# Patient Record
Sex: MALE | Race: WHITE | NOT HISPANIC OR LATINO | Employment: PART TIME | URBAN - METROPOLITAN AREA
[De-identification: names, ages, dates, MRNs, and addresses within clinical notes are randomized per-mention and may not be internally consistent; named-entity substitution may affect disease eponyms.]

---

## 2024-11-03 ENCOUNTER — APPOINTMENT (OUTPATIENT)
Dept: GENERAL RADIOLOGY | Facility: HOSPITAL | Age: 54
End: 2024-11-03
Payer: COMMERCIAL

## 2024-11-03 ENCOUNTER — HOSPITAL ENCOUNTER (EMERGENCY)
Facility: HOSPITAL | Age: 54
Discharge: HOME | End: 2024-11-03
Attending: EMERGENCY MEDICINE | Admitting: EMERGENCY MEDICINE
Payer: COMMERCIAL

## 2024-11-03 VITALS
HEART RATE: 81 BPM | BODY MASS INDEX: 31.5 KG/M2 | RESPIRATION RATE: 16 BRPM | HEIGHT: 70 IN | TEMPERATURE: 98.2 F | DIASTOLIC BLOOD PRESSURE: 75 MMHG | WEIGHT: 220 LBS | SYSTOLIC BLOOD PRESSURE: 147 MMHG | OXYGEN SATURATION: 96 %

## 2024-11-03 DIAGNOSIS — I10 HYPERTENSION, UNSPECIFIED TYPE: ICD-10-CM

## 2024-11-03 DIAGNOSIS — F41.0 PANIC ATTACK: Primary | ICD-10-CM

## 2024-11-03 LAB
ALBUMIN SERPL-MCNC: 4.3 G/DL (ref 3.5–5.2)
ALBUMIN/GLOB SERPL: 1.7 G/DL
ALP SERPL-CCNC: 50 U/L (ref 39–117)
ALT SERPL W P-5'-P-CCNC: 49 U/L (ref 1–41)
ANION GAP SERPL CALCULATED.3IONS-SCNC: 13 MMOL/L (ref 5–15)
AST SERPL-CCNC: 26 U/L (ref 1–40)
BASOPHILS # BLD AUTO: 0.05 10*3/MM3 (ref 0–0.2)
BASOPHILS NFR BLD AUTO: 0.4 % (ref 0–1.5)
BILIRUB SERPL-MCNC: 0.3 MG/DL (ref 0–1.2)
BUN SERPL-MCNC: 22 MG/DL (ref 6–20)
BUN/CREAT SERPL: 14.8 (ref 7–25)
CALCIUM SPEC-SCNC: 9.1 MG/DL (ref 8.6–10.5)
CHLORIDE SERPL-SCNC: 105 MMOL/L (ref 98–107)
CO2 SERPL-SCNC: 21 MMOL/L (ref 22–29)
CREAT SERPL-MCNC: 1.49 MG/DL (ref 0.76–1.27)
DEPRECATED RDW RBC AUTO: 44.3 FL (ref 37–54)
EGFRCR SERPLBLD CKD-EPI 2021: 55.4 ML/MIN/1.73
EOSINOPHIL # BLD AUTO: 0.17 10*3/MM3 (ref 0–0.4)
EOSINOPHIL NFR BLD AUTO: 1.5 % (ref 0.3–6.2)
ERYTHROCYTE [DISTWIDTH] IN BLOOD BY AUTOMATED COUNT: 13.6 % (ref 12.3–15.4)
GLOBULIN UR ELPH-MCNC: 2.6 GM/DL
GLUCOSE SERPL-MCNC: 157 MG/DL (ref 65–99)
HCT VFR BLD AUTO: 47.2 % (ref 37.5–51)
HGB BLD-MCNC: 15.2 G/DL (ref 13–17.7)
IMM GRANULOCYTES # BLD AUTO: 0.05 10*3/MM3 (ref 0–0.05)
IMM GRANULOCYTES NFR BLD AUTO: 0.4 % (ref 0–0.5)
LYMPHOCYTES # BLD AUTO: 1.01 10*3/MM3 (ref 0.7–3.1)
LYMPHOCYTES NFR BLD AUTO: 8.7 % (ref 19.6–45.3)
MCH RBC QN AUTO: 28.5 PG (ref 26.6–33)
MCHC RBC AUTO-ENTMCNC: 32.2 G/DL (ref 31.5–35.7)
MCV RBC AUTO: 88.6 FL (ref 79–97)
MONOCYTES # BLD AUTO: 0.76 10*3/MM3 (ref 0.1–0.9)
MONOCYTES NFR BLD AUTO: 6.6 % (ref 5–12)
NEUTROPHILS NFR BLD AUTO: 82.4 % (ref 42.7–76)
NEUTROPHILS NFR BLD AUTO: 9.55 10*3/MM3 (ref 1.7–7)
NRBC BLD AUTO-RTO: 0 /100 WBC (ref 0–0.2)
PLATELET # BLD AUTO: 258 10*3/MM3 (ref 140–450)
PMV BLD AUTO: 9.9 FL (ref 6–12)
POTASSIUM SERPL-SCNC: 4.2 MMOL/L (ref 3.5–5.2)
PROT SERPL-MCNC: 6.9 G/DL (ref 6–8.5)
RBC # BLD AUTO: 5.33 10*6/MM3 (ref 4.14–5.8)
SODIUM SERPL-SCNC: 139 MMOL/L (ref 136–145)
TSH SERPL DL<=0.05 MIU/L-ACNC: 0.58 UIU/ML (ref 0.27–4.2)
WBC NRBC COR # BLD AUTO: 11.59 10*3/MM3 (ref 3.4–10.8)

## 2024-11-03 PROCEDURE — 80050 GENERAL HEALTH PANEL: CPT | Performed by: EMERGENCY MEDICINE

## 2024-11-03 PROCEDURE — 99284 EMERGENCY DEPT VISIT MOD MDM: CPT

## 2024-11-03 PROCEDURE — 93005 ELECTROCARDIOGRAM TRACING: CPT | Performed by: EMERGENCY MEDICINE

## 2024-11-03 PROCEDURE — 71045 X-RAY EXAM CHEST 1 VIEW: CPT

## 2024-11-03 NOTE — ED PROVIDER NOTES
EMERGENCY DEPARTMENT ENCOUNTER    Room Number:  32/32  PCP: Keshav Santos MD  Historian: Patient      HPI:  Chief Complaint: Panic attack  A complete HPI/ROS/PMH/PSH/SH/FH are unobtainable due to: None  Context: Kenn Norton is a 54 y.o. male who presents to the ED c/o panic attack.  Patient states he has had problems with panic attack in the remote past.  States over the last 2 weeks has had increasing problems with panic attack.  Patient has history of newly diagnosed hypertension has been concerned about this.  States today started having severe anxiety with heart racing.  Patient states he had some panic.  Had no chest pain pressure tightness.  Was seen at urgent care and sent here for evaluation.            PAST MEDICAL HISTORY  Active Ambulatory Problems     Diagnosis Date Noted    No Active Ambulatory Problems     Resolved Ambulatory Problems     Diagnosis Date Noted    No Resolved Ambulatory Problems     Past Medical History:   Diagnosis Date    Anxiety     Celiac disease          PAST SURGICAL HISTORY  History reviewed. No pertinent surgical history.      FAMILY HISTORY  History reviewed. No pertinent family history.      SOCIAL HISTORY  Social History     Socioeconomic History    Marital status:    Tobacco Use    Smoking status: Never    Smokeless tobacco: Never   Vaping Use    Vaping status: Never Used   Substance and Sexual Activity    Alcohol use: Not Currently         ALLERGIES  Patient has no known allergies.        REVIEW OF SYSTEMS  Review of Systems   Anxiety      PHYSICAL EXAM  ED Triage Vitals   Temp Heart Rate Resp BP SpO2   11/03/24 1757 11/03/24 1757 11/03/24 1757 11/03/24 1800 11/03/24 1757   98.2 °F (36.8 °C) 107 16 (!) 182/85 98 %      Temp src Heart Rate Source Patient Position BP Location FiO2 (%)   -- -- -- -- --              Physical Exam      GENERAL: no acute distress  HENT: nares patent  EYES: no scleral icterus  CV: regular rhythm, normal rate  RESPIRATORY: normal  effort  ABDOMEN: soft  MUSCULOSKELETAL: no deformity  NEURO: alert, moves all extremities, follows commands  PSYCH:  calm, cooperative  SKIN: warm, dry    Vital signs and nursing notes reviewed.          LAB RESULTS  Recent Results (from the past 24 hours)   Comprehensive Metabolic Panel    Collection Time: 11/03/24  6:36 PM    Specimen: Blood   Result Value Ref Range    Glucose 157 (H) 65 - 99 mg/dL    BUN 22 (H) 6 - 20 mg/dL    Creatinine 1.49 (H) 0.76 - 1.27 mg/dL    Sodium 139 136 - 145 mmol/L    Potassium 4.2 3.5 - 5.2 mmol/L    Chloride 105 98 - 107 mmol/L    CO2 21.0 (L) 22.0 - 29.0 mmol/L    Calcium 9.1 8.6 - 10.5 mg/dL    Total Protein 6.9 6.0 - 8.5 g/dL    Albumin 4.3 3.5 - 5.2 g/dL    ALT (SGPT) 49 (H) 1 - 41 U/L    AST (SGOT) 26 1 - 40 U/L    Alkaline Phosphatase 50 39 - 117 U/L    Total Bilirubin 0.3 0.0 - 1.2 mg/dL    Globulin 2.6 gm/dL    A/G Ratio 1.7 g/dL    BUN/Creatinine Ratio 14.8 7.0 - 25.0    Anion Gap 13.0 5.0 - 15.0 mmol/L    eGFR 55.4 (L) >60.0 mL/min/1.73   TSH    Collection Time: 11/03/24  6:36 PM    Specimen: Blood   Result Value Ref Range    TSH 0.585 0.270 - 4.200 uIU/mL   CBC Auto Differential    Collection Time: 11/03/24  6:36 PM    Specimen: Blood   Result Value Ref Range    WBC 11.59 (H) 3.40 - 10.80 10*3/mm3    RBC 5.33 4.14 - 5.80 10*6/mm3    Hemoglobin 15.2 13.0 - 17.7 g/dL    Hematocrit 47.2 37.5 - 51.0 %    MCV 88.6 79.0 - 97.0 fL    MCH 28.5 26.6 - 33.0 pg    MCHC 32.2 31.5 - 35.7 g/dL    RDW 13.6 12.3 - 15.4 %    RDW-SD 44.3 37.0 - 54.0 fl    MPV 9.9 6.0 - 12.0 fL    Platelets 258 140 - 450 10*3/mm3    Neutrophil % 82.4 (H) 42.7 - 76.0 %    Lymphocyte % 8.7 (L) 19.6 - 45.3 %    Monocyte % 6.6 5.0 - 12.0 %    Eosinophil % 1.5 0.3 - 6.2 %    Basophil % 0.4 0.0 - 1.5 %    Immature Grans % 0.4 0.0 - 0.5 %    Neutrophils, Absolute 9.55 (H) 1.70 - 7.00 10*3/mm3    Lymphocytes, Absolute 1.01 0.70 - 3.10 10*3/mm3    Monocytes, Absolute 0.76 0.10 - 0.90 10*3/mm3    Eosinophils,  Absolute 0.17 0.00 - 0.40 10*3/mm3    Basophils, Absolute 0.05 0.00 - 0.20 10*3/mm3    Immature Grans, Absolute 0.05 0.00 - 0.05 10*3/mm3    nRBC 0.0 0.0 - 0.2 /100 WBC   ECG 12 Lead Tachycardia    Collection Time: 11/03/24  7:09 PM   Result Value Ref Range    QT Interval 355 ms    QTC Interval 435 ms       Ordered the above labs and reviewed the results.        RADIOLOGY  XR Chest 1 View    Result Date: 11/3/2024  XR CHEST 1 VW-  HISTORY: 54-year-old male with shortness of air.  FINDINGS: Limited apical lordotic projection. There is no evidence for pneumonia, effusions, or CHF. Follow-up with 2 views of the chest is recommended.  This report was finalized on 11/3/2024 6:52 PM by Dr. Mali Foley M.D on Workstation: [id number]       Ordered the above noted radiological studies.  Chest x-ray independently interpreted by me and shows no evidence of pneumonia          PROCEDURES  Procedures    EKG          EKG time: 1909  Rhythm/Rate: Normal sinus rhythm 90  P waves and W[place]: Normal P waves  QRS, axis: Normal QRS  ST and T waves: Normal ST-T waves    Interpreted Contemporaneously by me, independently viewed  No prior      MEDICATIONS GIVEN IN ER  Medications - No data to display                MEDICAL DECISION MAKING, PROGRESS, and CONSULTS    All labs have been independently reviewed by me.  All radiology studies have been reviewed by me and I have also reviewed the radiology report.   EKG's independently viewed and interpreted by me.  Discussion below represents my analysis of pertinent findings related to patient's condition, differential diagnosis, treatment plan and final disposition.      Additional sources:  - Discussed/ obtained information from independent historians: None    - External (non-ED) record review: Epic reviewed patient seen today at urgent care for anxiety and palpitations    - Chronic or social conditions impacting care: None    - Shared decision making: None      Orders placed during this  visit:  Orders Placed This Encounter   Procedures    XR Chest 1 View    Comprehensive Metabolic Panel    TSH    CBC Auto Differential    ECG 12 Lead Tachycardia    CBC & Differential         Additional orders considered but not ordered:  None        Differential diagnosis includes but is not limited to:    Panic attack versus generalized anxiety versus dysrhythmia      Independent interpretation of labs, radiology studies, and discussions with consultants:  ED Course as of 11/03/24 2220   Sun Nov 03, 2024 2002 20:02 EST  Patient here for anxiety and panic attacks.  Patient states he has been significantly more anxious after hearing he has high blood pressure.  Patient states he feels significantly better now.  Patient has appointment with primary provider tomorrow.  Will hold off on starting medications for now.  Patient's creatinine is mildly elevated.  Last creatinine to compare to was 9 years ago.  Patient will make sure he lets his primary doctor see this tomorrow. [SL]      ED Course User Index  [SL] Elia Salmon MD                 DIAGNOSIS  Final diagnoses:   Panic attack   Hypertension, unspecified type         DISPOSITION  DISCHARGE    Patient discharged in stable condition.    Reviewed implications of results, diagnosis, meds, responsibility to follow up, warning signs and symptoms of possible worsening, potential complications and reasons to return to ER, including worsening symptoms    Patient/Family voiced understanding of above instructions.    Discussed plan for discharge, as there is no emergent indication for admission. Patient referred to primary care provider for BP management due to today's BP. Pt/family is agreeable and understands need for follow up and repeat testing.  Pt is aware that discharge does not mean that nothing is wrong but it indicates no emergency is present that requires admission and they must continue care with follow-up as given below or physician of their choice.      FOLLOW-UP  Keshav Santos MD  [address]  [address]  [facility] [address]  [phone]    Schedule an appointment as soon as possible for a visit            Medication List      No changes were made to your prescriptions during this visit.                Latest Documented Vital Signs:  As of 22:20 EST  BP- 147/75 HR- 81 Temp- 98.2 °F (36.8 °C) O2 sat- 96%              --    Please note that portions of this were completed with a voice recognition program.       Note Disclaimer: At [facility], we believe that sharing information builds trust and better relationships. You are receiving this note because you are receiving care at [facility] or recently visited. It is possible you will see health information before a provider has talked with you about it. This kind of information can be easy to misunderstand. To help you fully understand what it means for your health, we urge you to discuss this note with your provider.            Elia Salmon MD  11/03/24 2584

## 2024-11-04 ENCOUNTER — OFFICE VISIT (OUTPATIENT)
Dept: INTERNAL MEDICINE | Facility: CLINIC | Age: 54
End: 2024-11-04
Payer: COMMERCIAL

## 2024-11-04 VITALS
SYSTOLIC BLOOD PRESSURE: 158 MMHG | TEMPERATURE: 98.2 F | WEIGHT: 223 LBS | HEIGHT: 70 IN | DIASTOLIC BLOOD PRESSURE: 86 MMHG | BODY MASS INDEX: 31.92 KG/M2

## 2024-11-04 DIAGNOSIS — Z11.59 ENCOUNTER FOR HEPATITIS C SCREENING TEST FOR LOW RISK PATIENT: ICD-10-CM

## 2024-11-04 DIAGNOSIS — F41.0 PANIC ATTACKS: ICD-10-CM

## 2024-11-04 DIAGNOSIS — Z00.00 WELLNESS EXAMINATION: ICD-10-CM

## 2024-11-04 DIAGNOSIS — Z12.5 SCREENING FOR PROSTATE CANCER: ICD-10-CM

## 2024-11-04 DIAGNOSIS — I10 HYPERTENSION, UNSPECIFIED TYPE: Primary | ICD-10-CM

## 2024-11-04 PROBLEM — K90.0 CD (CELIAC DISEASE): Status: ACTIVE | Noted: 2024-11-04

## 2024-11-04 LAB
QT INTERVAL: 355 MS
QTC INTERVAL: 435 MS

## 2024-11-04 PROCEDURE — 99204 OFFICE O/P NEW MOD 45 MIN: CPT | Performed by: STUDENT IN AN ORGANIZED HEALTH CARE EDUCATION/TRAINING PROGRAM

## 2024-11-04 RX ORDER — CITALOPRAM HYDROBROMIDE 10 MG/1
TABLET ORAL
Qty: 67 TABLET | Refills: 0 | Status: SHIPPED | OUTPATIENT
Start: 2024-11-04 | End: 2024-12-11

## 2024-11-04 RX ORDER — PROPRANOLOL HYDROCHLORIDE 10 MG/1
10 TABLET ORAL 2 TIMES DAILY PRN
Qty: 60 TABLET | Refills: 2 | Status: SHIPPED | OUTPATIENT
Start: 2024-11-04

## 2024-11-04 RX ORDER — AMLODIPINE BESYLATE 5 MG/1
5 TABLET ORAL DAILY
Qty: 90 TABLET | Refills: 1 | Status: SHIPPED | OUTPATIENT
Start: 2024-11-04

## 2024-11-04 NOTE — PROGRESS NOTES
New Patient Office Visit      Patient Name: Kenn Norton  : 1970   MRN: [id number]   Care Team: Patient Care Team:  Keshav Santos MD as PCP - General (Internal Medicine)    Chief Complaint:    Chief Complaint   Patient presents with    Elevated Blood Pressure    Establish Care       History of Present Illness: Kenn Norton is a 54 y.o. male who is here today to establish care.      He has not been seen by primary care in some time however wanted to make this appointment to check blood pressure    His primary concern today is previous elevated blood pressure readings as well as panic attacks that have been going on for the past 6 weeks.  He states he has had his blood pressure checked more than 2 times over the past 6 months he was told his is elevated.  Additionally he has been having ongoing panic attacks and anxiety due to recent stressors in his life, he went to the emergency room yesterday with workup being unremarkable and reassuring.  He was previously on Celexa however symptoms were stable so previous psychiatrist tapered and discontinued this.    Other medical history is notable for celiac disease, he does have a family history of bladder and prostate cancer in his father.  Does have coronary artery disease in his father and brother, brother had myocardial infarction at age 55.        Subjective      Review of Systems:   Review of Systems - See HPI    Past Medical History:   Past Medical History:   Diagnosis Date    Anxiety     Celiac disease     History of medical problems 2010    Celiac Disease       Past Surgical History: History reviewed. No pertinent surgical history.    Family History:   Family History   Problem Relation Age of Onset    Diabetes Mother     Hyperlipidemia Mother     Cancer Father     Diabetes Father     Hearing loss Father     Heart disease Father     Hyperlipidemia Father     Heart disease Brother     Hyperlipidemia Brother        Social History:   Social  "History     Socioeconomic History    Marital status:    Tobacco Use    Smoking status: Never    Smokeless tobacco: Never   Vaping Use    Vaping status: Never Used   Substance and Sexual Activity    Alcohol use: Never    Drug use: Never    Sexual activity: Yes     Partners: Female     Birth control/protection: Vasectomy       Tobacco History:   Social History     Tobacco Use   Smoking Status Never   Smokeless Tobacco Never       Medications:     Current Outpatient Medications:     amLODIPine (NORVASC) 5 MG tablet, Take 1 tablet by mouth Daily., Disp: 90 tablet, Rfl: 1    citalopram (CeleXA) 10 MG tablet, Take 1 tablet by mouth Daily for 7 days, THEN 2 tablets Daily for 30 days., Disp: 67 tablet, Rfl: 0    propranolol (INDERAL) 10 MG tablet, Take 1 tablet by mouth 2 (Two) Times a Day As Needed (anxiety)., Disp: 60 tablet, Rfl: 2    Allergies:   No Known Allergies    Objective     Physical Exam:  Vital Signs:   Vitals:    11/04/24 1508   BP: 158/86   BP Location: Left arm   Patient Position: Sitting   Temp: 98.2 °F (36.8 °C)   Weight: 101 kg (223 lb)   Height: 177.8 cm (70\")     Body mass index is 32 kg/m².     BMI is >= 30 and <35. (Class 1 Obesity). The following options were offered after discussion;: exercise counseling/recommendations      Physical Exam  Vitals reviewed.   Constitutional:       General: He is not in acute distress.     Appearance: Normal appearance. He is obese. He is not toxic-appearing.   HENT:      Head: Normocephalic and atraumatic.   Eyes:      General: No scleral icterus.     Conjunctiva/sclera: Conjunctivae normal.   Cardiovascular:      Rate and Rhythm: Normal rate and regular rhythm.      Heart sounds: Normal heart sounds.   Pulmonary:      Effort: Pulmonary effort is normal. No respiratory distress.      Breath sounds: Normal breath sounds. No wheezing.   Skin:     General: Skin is warm and dry.   Neurological:      Mental Status: He is alert and oriented to person, place, and " time.   Psychiatric:         Mood and Affect: Mood normal.         Behavior: Behavior normal.         Assessment / Plan      Assessment/Plan:   Problems Addressed This Visit  Diagnoses and all orders for this visit:    1. Hypertension, unspecified type (Primary)  -     amLODIPine (NORVASC) 5 MG tablet; Take 1 tablet by mouth Daily.  Dispense: 90 tablet; Refill: 1    2. Panic attacks  -     citalopram (CeleXA) 10 MG tablet; Take 1 tablet by mouth Daily for 7 days, THEN 2 tablets Daily for 30 days.  Dispense: 67 tablet; Refill: 0  -     propranolol (INDERAL) 10 MG tablet; Take 1 tablet by mouth 2 (Two) Times a Day As Needed (anxiety).  Dispense: 60 tablet; Refill: 2    3. Wellness examination  -     Lipid Panel With / Chol / HDL Ratio; Future  -     Comprehensive Metabolic Panel; Future  -     CBC & Differential; Future    4. Screening for prostate cancer  -     PSA Screen; Future    5. Encounter for hepatitis C screening test for low risk patient  -     Hepatitis C Antibody; Future      Reviewed most recent ED visit from yesterday, labs from yesterday    Hypertension is likely chronic and untreated, given multiple readings at home and at other office visits we will plan to initiate treatment with amlodipine 5 mg to start and will follow-up in about 1 month or sooner.    Anxiety and associated panic attacks are chronic and worsening, will plan to start Celexa and taper up from 10 mg to 20 mg as well as start propranolol as needed.  Reviewed EKG obtained during ED visit and reassuring,    Will plan to have him follow-up in about 1 month for annual physical or sooner as needed, labs prior      Plan of care reviewed with patient at the conclusion of today's visit. Education was provided regarding diagnosis and management.  Patient verbalizes understanding of and agreement with management plan.      Follow Up:   Return in about 1 month (around 12/4/2024) for Annual physical.          MD SAUL [facility]choaK [facility]  [facility]  [address] [place] [phone]  Fax [phone]

## 2024-11-26 ENCOUNTER — LAB (OUTPATIENT)
Facility: HOSPITAL | Age: 54
End: 2024-11-26
Payer: COMMERCIAL

## 2024-11-26 PROCEDURE — 86803 HEPATITIS C AB TEST: CPT | Performed by: STUDENT IN AN ORGANIZED HEALTH CARE EDUCATION/TRAINING PROGRAM

## 2024-11-26 PROCEDURE — 85025 COMPLETE CBC W/AUTO DIFF WBC: CPT | Performed by: STUDENT IN AN ORGANIZED HEALTH CARE EDUCATION/TRAINING PROGRAM

## 2024-11-26 PROCEDURE — 80053 COMPREHEN METABOLIC PANEL: CPT | Performed by: STUDENT IN AN ORGANIZED HEALTH CARE EDUCATION/TRAINING PROGRAM

## 2024-11-26 PROCEDURE — G0103 PSA SCREENING: HCPCS | Performed by: STUDENT IN AN ORGANIZED HEALTH CARE EDUCATION/TRAINING PROGRAM

## 2024-11-26 PROCEDURE — 80061 LIPID PANEL: CPT | Performed by: STUDENT IN AN ORGANIZED HEALTH CARE EDUCATION/TRAINING PROGRAM

## 2024-12-11 ENCOUNTER — OFFICE VISIT (OUTPATIENT)
Dept: INTERNAL MEDICINE | Facility: CLINIC | Age: 54
End: 2024-12-11
Payer: COMMERCIAL

## 2024-12-11 VITALS
TEMPERATURE: 97.1 F | HEIGHT: 70 IN | BODY MASS INDEX: 31.26 KG/M2 | DIASTOLIC BLOOD PRESSURE: 80 MMHG | HEART RATE: 98 BPM | OXYGEN SATURATION: 99 % | WEIGHT: 218.4 LBS | SYSTOLIC BLOOD PRESSURE: 142 MMHG

## 2024-12-11 DIAGNOSIS — Z00.00 ANNUAL PHYSICAL EXAM: Primary | ICD-10-CM

## 2024-12-11 DIAGNOSIS — Z23 NEED FOR TDAP VACCINATION: ICD-10-CM

## 2024-12-11 DIAGNOSIS — Z12.11 SCREENING FOR COLON CANCER: ICD-10-CM

## 2024-12-11 DIAGNOSIS — I10 HYPERTENSION, UNSPECIFIED TYPE: ICD-10-CM

## 2024-12-11 DIAGNOSIS — F41.0 PANIC ATTACKS: ICD-10-CM

## 2024-12-11 PROCEDURE — 90471 IMMUNIZATION ADMIN: CPT | Performed by: STUDENT IN AN ORGANIZED HEALTH CARE EDUCATION/TRAINING PROGRAM

## 2024-12-11 PROCEDURE — 90715 TDAP VACCINE 7 YRS/> IM: CPT | Performed by: STUDENT IN AN ORGANIZED HEALTH CARE EDUCATION/TRAINING PROGRAM

## 2024-12-11 PROCEDURE — 99396 PREV VISIT EST AGE 40-64: CPT | Performed by: STUDENT IN AN ORGANIZED HEALTH CARE EDUCATION/TRAINING PROGRAM

## 2024-12-11 RX ORDER — AMLODIPINE BESYLATE 10 MG/1
10 TABLET ORAL DAILY
Qty: 90 TABLET | Refills: 1 | Status: SHIPPED | OUTPATIENT
Start: 2024-12-11

## 2024-12-11 NOTE — PROGRESS NOTES
"Chief Complaint  Annual Exam and Hypertension    Subjective        Kenn Norton presents to [facility]  History of Present Illness    Presents to clinic today for CPE    He is doing well since last visit. Reports improvement in anxiety and panic attacks since last visit with celexa. He has not needed to take propranolol. He tried to go up to 20mg but had some side effects.     He has been checking his blood pressure at home, been somewhat elevated but improved.     Review of Systems   Constitutional:  Negative for fatigue, fever and unexpected weight change.   HENT:  Negative for congestion and rhinorrhea.    Eyes:  Negative for photophobia and visual disturbance.   Respiratory:  Negative for chest tightness and shortness of breath.    Cardiovascular:  Negative for chest pain and palpitations.   Gastrointestinal:  Negative for abdominal pain, diarrhea and vomiting.   Endocrine: Negative for polydipsia and polyuria.   Genitourinary:  Negative for difficulty urinating, frequency and hematuria.   Musculoskeletal:  Negative for arthralgias, gait problem and joint swelling.   Skin:  Negative for color change and rash.   Neurological:  Negative for seizures, syncope and headaches.   Psychiatric/Behavioral:  Negative for self-injury and suicidal ideas. The patient is not nervous/anxious.            Objective   Vital Signs:  /80   Pulse 98   Temp 97.1 °F (36.2 °C) (Temporal)   Ht 177.8 cm (70\")   Wt 99.1 kg (218 lb 6.4 oz)   SpO2 99%   BMI 31.34 kg/m²   Estimated body mass index is 31.34 kg/m² as calculated from the following:    Height as of this encounter: 177.8 cm (70\").    Weight as of this encounter: 99.1 kg (218 lb 6.4 oz).            Physical Exam  Vitals reviewed.   Constitutional:       General: He is not in acute distress.     Appearance: Normal appearance.   HENT:      Head: Normocephalic and atraumatic.      Right Ear: External ear normal.      Left Ear: External " ear normal.      Nose: Nose normal. No rhinorrhea.   Eyes:      General:         Right eye: No discharge.         Left eye: No discharge.      Extraocular Movements: Extraocular movements intact.      Conjunctiva/sclera: Conjunctivae normal.   Cardiovascular:      Rate and Rhythm: Normal rate and regular rhythm.      Heart sounds: Normal heart sounds.   Pulmonary:      Effort: Pulmonary effort is normal. No respiratory distress.      Breath sounds: Normal breath sounds.   Abdominal:      General: Abdomen is flat. There is no distension.      Palpations: Abdomen is soft.   Musculoskeletal:         General: No swelling or deformity. Normal range of motion.      Cervical back: Normal range of motion and neck supple.   Skin:     General: Skin is warm and dry.   Neurological:      General: No focal deficit present.      Mental Status: He is alert and oriented to person, place, and time. Mental status is at baseline.   Psychiatric:         Mood and Affect: Mood normal.         Behavior: Behavior normal.        Result Review :                Assessment and Plan   Diagnoses and all orders for this visit:    1. Annual physical exam (Primary)    2. Hypertension, unspecified type  -     amLODIPine (NORVASC) 10 MG tablet; Take 1 tablet by mouth Daily.  Dispense: 90 tablet; Refill: 1    3. Panic attacks    4. Screening for colon cancer  -     Ambulatory Referral For Screening Colonoscopy    5. Need for Tdap vaccination  -     Tdap Vaccine => 8yo IM (BOOSTRIX/ADACEL)      Immunizations: Tdap given today, recommend shingrix. He will get flu through his wife who is pharmacist  Cancer screening: c-scope due, ordered  Metabolic Labs: UTD  Recommend maintaining a healthy lifestyle, rich in whole grains, fruits and vegetables. Limit high saturated fats and processed sugars. Maintain an active lifestyle with goal of 150 min of moderate aerobic exercise per week      Will increase amlodipine 10mg, BP has improved but still have room for  improvement    Will continue celexa at 10mg given significant improvement in anxiety/panic attacks    C-scope referral ordered         Follow Up   Return in about 6 months (around 6/11/2025).  Patient was given instructions and counseling regarding his condition or for health maintenance advice. Please see specific information pulled into the AVS if appropriate.

## 2025-01-10 DIAGNOSIS — F41.0 PANIC ATTACKS: ICD-10-CM

## 2025-01-13 RX ORDER — CITALOPRAM HYDROBROMIDE 10 MG/1
TABLET ORAL
Qty: 67 TABLET | Refills: 0 | Status: SHIPPED | OUTPATIENT
Start: 2025-01-13 | End: 2025-02-18

## 2025-03-21 DIAGNOSIS — F41.0 PANIC ATTACKS: Primary | ICD-10-CM

## 2025-03-21 RX ORDER — CITALOPRAM HYDROBROMIDE 20 MG/1
20 TABLET ORAL DAILY
Qty: 90 TABLET | Refills: 1 | Status: SHIPPED | OUTPATIENT
Start: 2025-03-21

## 2025-07-09 DIAGNOSIS — I10 HYPERTENSION, UNSPECIFIED TYPE: ICD-10-CM

## 2025-07-09 RX ORDER — AMLODIPINE BESYLATE 10 MG/1
10 TABLET ORAL DAILY
Qty: 90 TABLET | Refills: 1 | Status: SHIPPED | OUTPATIENT
Start: 2025-07-09

## 2025-07-22 ENCOUNTER — OFFICE VISIT (OUTPATIENT)
Dept: INTERNAL MEDICINE | Facility: CLINIC | Age: 55
End: 2025-07-22
Payer: COMMERCIAL

## 2025-07-22 VITALS
HEART RATE: 96 BPM | WEIGHT: 218 LBS | BODY MASS INDEX: 31.21 KG/M2 | SYSTOLIC BLOOD PRESSURE: 144 MMHG | DIASTOLIC BLOOD PRESSURE: 89 MMHG | OXYGEN SATURATION: 96 % | HEIGHT: 70 IN

## 2025-07-22 DIAGNOSIS — Z12.5 SCREENING FOR PROSTATE CANCER: ICD-10-CM

## 2025-07-22 DIAGNOSIS — Z00.00 WELLNESS EXAMINATION: ICD-10-CM

## 2025-07-22 DIAGNOSIS — B35.4 TINEA CORPORIS: ICD-10-CM

## 2025-07-22 DIAGNOSIS — F41.0 PANIC ATTACKS: ICD-10-CM

## 2025-07-22 DIAGNOSIS — I10 HYPERTENSION, UNSPECIFIED TYPE: Primary | ICD-10-CM

## 2025-07-22 RX ORDER — CITALOPRAM HYDROBROMIDE 10 MG/1
10 TABLET ORAL DAILY
Qty: 90 TABLET | Refills: 1 | Status: SHIPPED | OUTPATIENT
Start: 2025-07-22

## 2025-07-22 RX ORDER — CITALOPRAM HYDROBROMIDE 10 MG/1
10 TABLET ORAL DAILY
COMMUNITY
End: 2025-07-22 | Stop reason: SDUPTHER

## 2025-07-22 RX ORDER — TERBINAFINE HYDROCHLORIDE 250 MG/1
250 TABLET ORAL DAILY
Qty: 14 TABLET | Refills: 0 | Status: SHIPPED | OUTPATIENT
Start: 2025-07-22 | End: 2025-08-05

## 2025-07-22 RX ORDER — AMLODIPINE BESYLATE 10 MG/1
10 TABLET ORAL DAILY
Qty: 90 TABLET | Refills: 1 | Status: SHIPPED | OUTPATIENT
Start: 2025-07-22

## 2025-07-22 NOTE — PROGRESS NOTES
"Chief Complaint  Hypertension and Anxiety    Subjective        Kenn Norton presents to [facility]  History of Present Illness    Presents to clinic today for follow up    He has been keeping an eye on his blood pressure and reports when he checks it at home it is largely around 130/80 and rarely above 135 systolic. He is taking amlodipine without side effects    He does report of some jock itch that has been refractory to OTC topical solution.     Objective   Vital Signs:  /89   Pulse 96   Ht 177.8 cm (70\")   Wt 98.9 kg (218 lb)   SpO2 96%   BMI 31.28 kg/m²   Estimated body mass index is 31.28 kg/m² as calculated from the following:    Height as of this encounter: 177.8 cm (70\").    Weight as of this encounter: 98.9 kg (218 lb).            Physical Exam  Vitals reviewed.   Constitutional:       General: He is not in acute distress.     Appearance: Normal appearance. He is not toxic-appearing.   HENT:      Head: Normocephalic and atraumatic.   Eyes:      General: No scleral icterus.     Conjunctiva/sclera: Conjunctivae normal.   Skin:     General: Skin is warm and dry.   Neurological:      Mental Status: He is alert and oriented to person, place, and time.   Psychiatric:         Mood and Affect: Mood normal.         Behavior: Behavior normal.        Result Review :                Assessment and Plan   Diagnoses and all orders for this visit:    1. Hypertension, unspecified type (Primary)  -     amLODIPine (NORVASC) 10 MG tablet; Take 1 tablet by mouth Daily.  Dispense: 90 tablet; Refill: 1    2. Tinea corporis  -     terbinafine (lamiSIL) 250 MG tablet; Take 1 tablet by mouth Daily for 14 days.  Dispense: 14 tablet; Refill: 0    3. Panic attacks  -     citalopram (CeleXA) 10 MG tablet; Take 1 tablet by mouth Daily.  Dispense: 90 tablet; Refill: 1    4. Wellness examination  -     Comprehensive Metabolic Panel; Future  -     Lipid Panel With / Chol / HDL Ratio; Future  - "     CBC & Differential; Future  -     Hemoglobin A1c; Future    5. Screening for prostate cancer  -     PSA Screen; Future      HTN is above goal today in clinic and on repeat but reports readings at home are about at goal. I recommend we keep eye on this and avoid adding additional agent for now but if persistently >135/85 may need to add and he will RTC sooner    Doing well on celexa for MARGARITA/panic disorder    RTC in 6mo for CPE, labs prior        Follow Up   Return in about 6 months (around 1/22/2026) for Annual physical, Lab before FU.  Patient was given instructions and counseling regarding his condition or for health maintenance advice. Please see specific information pulled into the AVS if appropriate.